# Patient Record
Sex: FEMALE | Race: WHITE | NOT HISPANIC OR LATINO | URBAN - METROPOLITAN AREA
[De-identification: names, ages, dates, MRNs, and addresses within clinical notes are randomized per-mention and may not be internally consistent; named-entity substitution may affect disease eponyms.]

---
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
## 2022-02-11 VITALS
TEMPERATURE: 100.5 F | OXYGEN SATURATION: 97 % | HEIGHT: 63 IN | WEIGHT: 164.4 LBS | HEART RATE: 125 BPM | BODY MASS INDEX: 29.13 KG/M2

## 2022-02-15 NOTE — NURSING NOTE
Comprehensive Intake Entered On:  2/27/2020 10:21 AM CST    Performed On:  2/27/2020 10:19 AM CST by Aubrie Pemberton CMA               Summary   Chief Complaint :   Pt here for cold and cough   Weight Measured :   164.4 lb(Converted to: 164 lb 6 oz, 74.57 kg)    Height Measured :   63 in(Converted to: 5 ft 3 in, 160.02 cm)    Body Mass Index :   29.12 kg/m2   Body Surface Area :   1.82 m2   Peripheral Pulse Rate :   125 bpm (HI)    Temperature Tympanic :   100.5 DegF(Converted to: 38.1 DegC)    Oxygen Saturation :   97 %   Aubrie Pemberton CMA - 2/27/2020 10:19 AM CST   Health Status   Allergies Verified? :   Yes   Medication History Verified? :   Yes   Medical History Verified? :   Yes   Pre-Visit Planning Status :   Completed   Tobacco Use? :   Never smoker   Aubrie Pemberton CMA - 2/27/2020 10:19 AM CST   Consents   Consent for Immunization Exchange :   Consent Granted   Consent for Immunizations to Providers :   Consent Granted   Aubrie Pemberton CMA - 2/27/2020 10:19 AM CST   Meds / Allergies   (As Of: 2/27/2020 10:21:46 AM CST)   Allergies (Active)   No Known Medication Allergies  Estimated Onset Date:   Unspecified ; Created By:   Aubrie Pemberton CMA; Reaction Status:   Active ; Category:   Drug ; Substance:   No Known Medication Allergies ; Type:   Allergy ; Updated By:   Aubrie Pemberton CMA; Reviewed Date:   2/27/2020 10:21 AM CST        Medication List   (As Of: 2/27/2020 10:21:46 AM CST)   No Known Home Medications     Aubrie Pemberton CMA - 2/27/2020 10:21:28 AM

## 2022-02-15 NOTE — NURSING NOTE
Influenza A/B POC Entered On:  2/27/2020 10:50 AM CST    Performed On:  2/27/2020 10:49 AM CST by Aubrie Pemberton CMA               Influenza A/B POC   Influenza A POC Result :   Negative   Influenza B POC Result :   Negative   Aubrie Pemberton CMA - 2/27/2020 10:42 AM CST   Details   Collection Date :   2/27/2020 10:30 AM CST   Handling Specimen POC :   nasopharyngeal   POC Test Comments :   Lab Test performed by:  [facility]  [facility] [address]  [place] [address]  Phone # [phone]  Fax # [phone]     Aubrie Pemberton CMA - 2/27/2020 10:42 AM CST

## 2022-02-15 NOTE — NURSING NOTE
PPD Reading POC Entered On:  10/9/2019 4:13 PM CDT    Performed On:  10/9/2019 4:13 PM CDT by Aubrie Pemberton CMA               PPD Reading   PPD mm of Induration :   0 mm   PPD Interpretation :   Negative   Aubrie Pemberton CMA - 10/9/2019 4:13 PM CDT

## 2022-02-15 NOTE — PROGRESS NOTES
Patient:   JENNIFER COTO            MRN: [id number]            FIN: [id number]               Age:   19 years     Sex:  Female     :  2000   Associated Diagnoses:   Strep throat   Author:   Ge Izaguirre PA-C      Visit Information   Accompanied by:  Mother.       Chief Complaint   2020 10:19 AM CST   Pt here for cold and cough      History of Present Illness   Chief complaint and symptoms noted above and confirmed with patient   last night developed sore throat, cough, fever, one episode of emesis  has used mucinex         Review of Systems   Constitutional:  Fever.    Ear/Nose/Mouth/Throat:  Nasal congestion, Sore throat.         Ear pain: Bilateral.    Respiratory:  Cough, Sputum production.    Gastrointestinal:  Vomiting.       Health Status   Allergies:    Allergic Reactions (Selected)  No Known Medication Allergies   Medications: not on any regular medications      Histories   Past Medical History:    No active or resolved past medical history items have been selected or recorded.   Family History:    No family history items have been selected or recorded.   Procedure history:    No active procedure history items have been selected or recorded.      Physical Examination   Vital Signs   2020 10:19 AM CST Temperature Tympanic 100.5 DegF    Peripheral Pulse Rate 125 bpm  HI    Oxygen Saturation 97 %      Measurements from flowsheet : Measurements   2020 10:19 AM CST Height Measured - Standard 63 in    Weight Measured - Standard 164.4 lb    BSA 1.82 m2    Body Mass Index 29.12 kg/m2    Body Mass Index Percentile 92.80      General:  No acute distress.    HENT:  Tympanic membranes are clear, No sinus tenderness, oropharynx is moderately enlarged and inflamed without exudate, nares are patent.    Neck:  Supple, mild anterior cervical lymphadenopathy, moderate tenderness.    Respiratory:  Lungs are clear to auscultation.    Cardiovascular:  Normal rate, Regular rhythm, No murmur.        Review / Management   Results review:       Interpretation: rapid strep is positive, rapid flu was negative for influenza A and B.       Impression and Plan   Diagnosis     Strep throat ([id number] J02.0).     Summary:  will treat with amoxicillin for 10 days, use tylenol/ibuprofen, salt water gargles, throat lozenges, follow up if not improving.    Orders     Orders   Pharmacy:  amoxicillin 875 mg oral tablet (Prescribe): = 1 tab(s) ( 875 mg ), Oral, bid, x 10 day(s), # 20 tab(s), 0 Refill(s), Type: Acute, Pharmacy: [facility], 1 tab(s) Oral bid,x10 day(s).     Orders   Charges (Evaluation and Management):  [id number] office outpatient visit 15 minutes (Charge) (Order): Quantity: 1, Strep throat.

## 2022-02-15 NOTE — NURSING NOTE
PPD Administration POC Entered On:  10/7/2019 2:41 PM CDT    Performed On:  10/7/2019 2:40 PM CDT by Jo-Ann Verma               PPD Administration   PPD Insertion Site :   Right forearm   PPD Amount Administered (mL) :   0.1 mL   Jo-Ann Verma - 10/7/2019 2:40 PM CDT   Details   Collection Date :   10/7/2019 2:45 PM CDT   Expiration Date :   6/17/2021 CDT   Lot#/Manufacture :   [id number]   Handling Specimen POC :   mantoux    :   sanofi pasteur Escoe RMA, Brandi - 10/7/2019 2:40 PM CDT

## 2022-08-10 NOTE — NURSING NOTE
Rapid Strep POC Entered On:  2/27/2020 10:42 AM CST    Performed On:  2/27/2020 10:42 AM CST by Aubrie Pemberton CMA               Rapid Strep POC   Rapid Strep POC :   Positive   Aubrie Pemberton CMA - 2/27/2020 10:42 AM CST   Details   Collection Date :   2/27/2020 10:30 AM CST   Handling Specimen POC :   throat   POC Test Comments :   Lab Test performed by:  [facility]  [address] [place] [address]  Phone # [phone]  Fax # [phone]     Aubrie Pemberton CMA - 2/27/2020 10:42 AM CST   Benzoyl Peroxide Pregnancy And Lactation Text: This medication is Pregnancy Category C. It is unknown if benzoyl peroxide is excreted in breast milk.